# Patient Record
Sex: MALE | Race: WHITE
[De-identification: names, ages, dates, MRNs, and addresses within clinical notes are randomized per-mention and may not be internally consistent; named-entity substitution may affect disease eponyms.]

---

## 2017-06-30 PROBLEM — Z00.00 ENCOUNTER FOR PREVENTIVE HEALTH EXAMINATION: Status: ACTIVE | Noted: 2017-06-30

## 2017-07-07 ENCOUNTER — APPOINTMENT (OUTPATIENT)
Dept: ORTHOPEDIC SURGERY | Facility: CLINIC | Age: 57
End: 2017-07-07

## 2023-05-19 ENCOUNTER — TESTING ONLY (OUTPATIENT)
Facility: LOCATION | Age: 63
End: 2023-05-19

## 2023-05-19 DIAGNOSIS — H40.013 OPEN ANGLE WITH BORDERLINE FINDINGS, LOW RISK, BILATERAL: Primary | ICD-10-CM

## 2023-05-19 PROCEDURE — STUDY STUDY PATIENTS: CUSTOM | Performed by: STUDENT IN AN ORGANIZED HEALTH CARE EDUCATION/TRAINING PROGRAM

## 2023-05-19 ASSESSMENT — INTRAOCULAR PRESSURE
OS: 12
OD: 12

## 2023-05-19 NOTE — IMPRESSION/PLAN
Impression: Open angle with borderline findings, low risk, bilateral: H40.013. Patient report he was being monitored for glaucoma for years in [facility] and is in need of a new specialist to monitor here. Plan: Recommend and emphasized the importance for patient to establish care with our office or another glaucoma specialist to be evaluated. Patient expressed understanding and states he will be seen with us. Plan: 
OCT/ONH OU, PACHY OU and OCT/MAC OU at the next visit.